# Patient Record
Sex: MALE | Race: OTHER | HISPANIC OR LATINO
[De-identification: names, ages, dates, MRNs, and addresses within clinical notes are randomized per-mention and may not be internally consistent; named-entity substitution may affect disease eponyms.]

---

## 2021-03-26 ENCOUNTER — DASHBOARD ENCOUNTERS (OUTPATIENT)
Age: 61
End: 2021-03-26

## 2021-03-26 ENCOUNTER — OFFICE VISIT (OUTPATIENT)
Dept: URBAN - METROPOLITAN AREA CLINIC 42 | Facility: CLINIC | Age: 61
End: 2021-03-26
Payer: COMMERCIAL

## 2021-03-26 DIAGNOSIS — Z12.11 SCREENING FOR COLON CANCER: ICD-10-CM

## 2021-03-26 PROCEDURE — 99202 OFFICE O/P NEW SF 15 MIN: CPT | Performed by: INTERNAL MEDICINE

## 2021-03-26 RX ORDER — SODIUM, POTASSIUM,MAG SULFATES 17.5-3.13G
354 ML SOLUTION, RECONSTITUTED, ORAL ORAL
Qty: 354 ML | Refills: 0 | OUTPATIENT
Start: 2021-03-26 | End: 2021-03-27

## 2021-03-26 NOTE — HPI-TODAY'S VISIT:
The patient is here for routine screening colonoscopy.  No previous colonoscopy.  No GI symptoms.  No family hx of colon cancer.

## 2021-04-20 ENCOUNTER — OFFICE VISIT (OUTPATIENT)
Dept: URBAN - METROPOLITAN AREA SURGERY CENTER 13 | Facility: SURGERY CENTER | Age: 61
End: 2021-04-20
Payer: COMMERCIAL

## 2021-04-20 DIAGNOSIS — Z12.11 COLON CANCER SCREENING: ICD-10-CM

## 2021-04-20 DIAGNOSIS — D12.2 ADENOMA OF ASCENDING COLON: ICD-10-CM

## 2021-04-20 DIAGNOSIS — D12.5 ADENOMA OF SIGMOID COLON: ICD-10-CM

## 2021-04-20 PROCEDURE — 45385 COLONOSCOPY W/LESION REMOVAL: CPT | Performed by: INTERNAL MEDICINE

## 2021-04-20 PROCEDURE — G8907 PT DOC NO EVENTS ON DISCHARG: HCPCS | Performed by: INTERNAL MEDICINE

## 2025-04-23 PROBLEM — 197119006: Status: ACTIVE | Noted: 2025-04-23

## 2025-04-23 PROBLEM — 735593008: Status: ACTIVE | Noted: 2025-04-23

## 2025-04-23 PROBLEM — 305058001: Status: ACTIVE | Noted: 2025-04-23

## 2025-04-24 ENCOUNTER — OFFICE VISIT (OUTPATIENT)
Dept: URBAN - METROPOLITAN AREA CLINIC 42 | Facility: CLINIC | Age: 65
End: 2025-04-24
Payer: MEDICARE

## 2025-04-24 DIAGNOSIS — K59.00 ACUTE CONSTIPATION: ICD-10-CM

## 2025-04-24 DIAGNOSIS — K57.92 ACUTE DIVERTICULITIS: ICD-10-CM

## 2025-04-24 DIAGNOSIS — Z12.11 SCREENING FOR COLON CANCER: ICD-10-CM

## 2025-04-24 DIAGNOSIS — K76.0 FATTY LIVER: ICD-10-CM

## 2025-04-24 PROBLEM — 197321007: Status: ACTIVE | Noted: 2025-04-24

## 2025-04-24 PROCEDURE — 99203 OFFICE O/P NEW LOW 30 MIN: CPT

## 2025-04-24 RX ORDER — DICYCLOMINE HYDROCHLORIDE 10 MG/1
2 CAPSULES CAPSULE ORAL THREE TIMES A DAY
Qty: 180 | OUTPATIENT
Start: 2025-04-24

## 2025-04-24 NOTE — HPI-TODAY'S VISIT:
64-year-old male, new patient, presents for ER follow-up after being seen at [facility] on 4/13 for right lower abdominal pain with acute constipation   BW 4/13/2025 including WBC 13.5, RBC 4.89, hemoglobin 15.7, hematocrit 45.8, MCV 93.7, platelet 146, sodium 134, potassium 3.7, glucose 130, BUN 11, creatinine 1, calcium 8.8, protein 8, albumin 3.9, total bilirubin 1.8, AP 71, AST 26, ALT 20, lipase 14, UA unremarkable.    He had been 2 days prior to the same ER and underwent CT A/P with contrast on 4/11/2025 which showed mild hepatic steatosis, moderate sigmoid diverticulitis without evidence of an abscess.  At that time he was prescribed Augmentin twice daily for 10 days however prescription was changed to metronidazole 500 mg twice daily for 10 days, with ciprofloxacin 500 mg twice daily for 10 days as well as Bentyl 20 mg 4 times daily. ~~ Today  He reports that he finished the abx on 4/16. His LLQ pain has improved significantly since finishing the abx, but he still has some "small" discomfort there He was 215lbs and is now 193lbs, since 12/2024. He notes that he has been eating healthier, but denies any apetite changes.   He deny N/V/GERD/dysphagia/abn weight loss/change in BH.  He does have a BM QD, no blood or mucus seen in the stool.   Prior EGD: none Prior Colonscopy: a couple of years ago, normal. Family Hx: none Prior Abdominal surgeries: none CP/SOB: none Recent Cardiology or Pulmonology Eval: none  Use of BT/NSAID/ GLP1 use: none

## 2025-05-19 ENCOUNTER — CLAIMS CREATED FROM THE CLAIM WINDOW (OUTPATIENT)
Dept: URBAN - METROPOLITAN AREA SURGERY CENTER 13 | Facility: SURGERY CENTER | Age: 65
End: 2025-05-19
Payer: MEDICARE

## 2025-05-19 DIAGNOSIS — Z87.19 PERSONAL HISTORY OF OTHER DISEASES OF THE DIGESTIVE SYSTEM: ICD-10-CM

## 2025-05-19 DIAGNOSIS — K57.30 DIVERTICULOSIS OF LARGE INTESTINE WITHOUT PERFORATION OR ABSCESS WITHOUT BLEEDING: ICD-10-CM

## 2025-05-19 DIAGNOSIS — Z09 ENCOUNTER FOR FOLLOW-UP EXAMINATION AFTER COMPLETED TREATMENT FOR CONDITIONS OTHER THAN MALIGNANT NEOPLASM: ICD-10-CM

## 2025-05-19 DIAGNOSIS — K59.00 ACUTE CONSTIPATION: ICD-10-CM

## 2025-05-19 PROCEDURE — 00811 ANES LWR INTST NDSC NOS: CPT | Performed by: NURSE ANESTHETIST, CERTIFIED REGISTERED

## 2025-05-19 PROCEDURE — 43239 EGD BIOPSY SINGLE/MULTIPLE: CPT | Performed by: INTERNAL MEDICINE

## 2025-05-19 PROCEDURE — 00811 ANES LWR INTST NDSC NOS: CPT | Performed by: ANESTHESIOLOGY

## 2025-05-19 PROCEDURE — 45378 DIAGNOSTIC COLONOSCOPY: CPT | Performed by: INTERNAL MEDICINE

## 2025-05-19 RX ORDER — DICYCLOMINE HYDROCHLORIDE 10 MG/1
2 CAPSULES CAPSULE ORAL THREE TIMES A DAY
Qty: 180 | Status: ACTIVE | COMMUNITY
Start: 2025-04-24